# Patient Record
Sex: MALE | Race: WHITE
[De-identification: names, ages, dates, MRNs, and addresses within clinical notes are randomized per-mention and may not be internally consistent; named-entity substitution may affect disease eponyms.]

---

## 2018-09-05 ENCOUNTER — HOSPITAL ENCOUNTER (EMERGENCY)
Dept: HOSPITAL 31 - C.ER | Age: 38
Discharge: HOME | End: 2018-09-05
Payer: MEDICAID

## 2018-09-05 VITALS
DIASTOLIC BLOOD PRESSURE: 82 MMHG | HEART RATE: 88 BPM | TEMPERATURE: 98.7 F | SYSTOLIC BLOOD PRESSURE: 123 MMHG | OXYGEN SATURATION: 95 % | RESPIRATION RATE: 18 BRPM
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
## 2018-09-05 DIAGNOSIS — F10.10: Primary | ICD-10-CM

## 2018-09-05 NOTE — C.PDOC
History Of Present Illness


37 y/o male presents to the ER requesting alcohol detox. Patient reports last 

drink was today. He denies other drug use or any symptoms of withdrawal.





Time Seen by Provider: 09/05/18 13:55


Chief Complaint (Nursing): Medical Clearance


History Per: Patient


History/Exam Limitations: no limitations





Past Medical History


Reviewed: Historical Data, Nursing Documentation, Vital Signs


Vital Signs: 


 Last Vital Signs











Temp  98.7 F   09/05/18 13:43


 


Pulse  88   09/05/18 13:43


 


Resp  18   09/05/18 13:43


 


BP  123/82   09/05/18 13:43


 


Pulse Ox  95   09/05/18 13:58














- Medical History


PMH: No Chronic Diseases


Other Surgeries: Hx of surgeries


Family History: States: No Known Family Hx





- Social History


Hx Alcohol Use: Yes (UNKOWN FREQ AND AMOUNT)


Hx Substance Use: No (UNKNOWN)





- Immunization History


Hx Tetanus Toxoid Vaccination: No


Hx Influenza Vaccination: No


Hx Pneumococcal Vaccination: No





Review Of Systems


Except As Marked, All Systems Reviewed And Found Negative.


Constitutional: Negative for: Fever, Chills





Physical Exam





- Physical Exam


Appears: Well, Non-toxic, No Acute Distress


Skin: Warm, Dry, No Rash


Head: Atraumatic, Normacephalic


Eye(s): bilateral: Normal Inspection


Oral Mucosa: Moist


Neck: Normal ROM


Chest: Symmetrical


Extremity: Bilateral: Atraumatic, Normal ROM


Neurological/Psych: Oriented x3, Normal Speech


Gait: Steady





ED Course And Treatment


O2 Sat by Pulse Oximetry: 95 (RA)


Pulse Ox Interpretation: Normal





Medical Decision Making


Medical Decision Making: 





Patient requesting detox from alcohol. Contact crisis worker Deepika to evaluate. 

As per Deepika the patient does not meet admission criteria for inpatient detox. 

Patient to be discharged. 





Disposition


Counseled Patient/Family Regarding: Diagnosis, Need For Followup





- Disposition


Referrals: 


[facility] [Outside]


Alcoholics Anonymous [Outside]


Disposition: HOME/ ROUTINE


Disposition Time: 13:57


Condition: STABLE


Instructions:  Alcohol Abuse and Alcoholism (DC)


Forms:  [facility] (English)





- POA


Present On Arrival: None





- Clinical Impression


Clinical Impression: 


 Alcohol abuse








- PA / NP / Resident Statement


MD/DO has reviewed & agrees with the documentation as recorded.





- Scribe Statement


The provider has reviewed the documentation as recorded by the Charles Scott





Provider Attestation





All medical record entries made by the Scribe were at my direction and 

personally dictated by me. I have reviewed the chart and agree that the record 

accurately reflects my personal performance of the history, physical exam, 

medical decision making, and the department course for this patient. I have 

also personally directed, reviewed, and agree with the discharge instructions 

and disposition.

## 2018-09-10 ENCOUNTER — HOSPITAL ENCOUNTER (INPATIENT)
Dept: HOSPITAL 31 - C.ER | Age: 38
LOS: 4 days | Discharge: HOME | DRG: 750 | End: 2018-09-14
Attending: PSYCHIATRY & NEUROLOGY | Admitting: PSYCHIATRY & NEUROLOGY
Payer: MEDICAID

## 2018-09-10 DIAGNOSIS — F10.120: Primary | ICD-10-CM
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
## 2018-09-10 DIAGNOSIS — F32.2: ICD-10-CM

## 2018-09-10 LAB
ALBUMIN SERPL-MCNC: 5.1 G/DL (ref 3.5–5)
ALBUMIN/GLOB SERPL: 1.6 {RATIO} (ref 1–2.1)
ALT SERPL-CCNC: 84 U/L (ref 21–72)
AST SERPL-CCNC: 78 U/L (ref 17–59)
BASOPHILS # BLD AUTO: 0.1 K/UL (ref 0–0.2)
BASOPHILS NFR BLD: 2.4 % (ref 0–2)
BILIRUB UR-MCNC: NEGATIVE MG/DL
BUN SERPL-MCNC: 12 MG/DL (ref 9–20)
CALCIUM SERPL-MCNC: 8.9 MG/DL (ref 8.6–10.4)
EOSINOPHIL # BLD AUTO: 0 K/UL (ref 0–0.7)
EOSINOPHIL NFR BLD: 0.6 % (ref 0–4)
ERYTHROCYTE [DISTWIDTH] IN BLOOD BY AUTOMATED COUNT: 14 % (ref 11.5–14.5)
GFR NON-AFRICAN AMERICAN: > 60
GLUCOSE UR STRIP-MCNC: NORMAL MG/DL
HGB BLD-MCNC: 16.9 G/DL (ref 12–18)
HYALINE CASTS #/AREA URNS LPF: (no result) /LPF (ref 0–2)
LEUKOCYTE ESTERASE UR-ACNC: (no result) LEU/UL
LYMPHOCYTES # BLD AUTO: 2 K/UL (ref 1–4.3)
LYMPHOCYTES NFR BLD AUTO: 33.3 % (ref 20–40)
MCH RBC QN AUTO: 31.5 PG (ref 27–31)
MCHC RBC AUTO-ENTMCNC: 33.7 G/DL (ref 33–37)
MCV RBC AUTO: 93.4 FL (ref 80–94)
MONOCYTES # BLD: 0.3 K/UL (ref 0–0.8)
MONOCYTES NFR BLD: 5.8 % (ref 0–10)
NEUTROPHILS # BLD: 3.5 K/UL (ref 1.8–7)
NEUTROPHILS NFR BLD AUTO: 57.9 % (ref 50–75)
NRBC BLD AUTO-RTO: 0.2 % (ref 0–2)
PH UR STRIP: 5 [PH] (ref 5–8)
PLATELET # BLD: 272 K/UL (ref 130–400)
PMV BLD AUTO: 8.1 FL (ref 7.2–11.7)
PROT UR STRIP-MCNC: (no result) MG/DL
RBC # BLD AUTO: 5.36 MIL/UL (ref 4.4–5.9)
RBC # UR STRIP: (no result) /UL
SP GR UR STRIP: 1.02 (ref 1–1.03)
SQUAMOUS EPITHIAL: < 1 /HPF (ref 0–5)
UROBILINOGEN UR-MCNC: 2 MG/DL (ref 0.2–1)
WBC # BLD AUTO: 6 K/UL (ref 4.8–10.8)

## 2018-09-10 PROCEDURE — HZ2ZZZZ DETOXIFICATION SERVICES FOR SUBSTANCE ABUSE TREATMENT: ICD-10-PCS | Performed by: PSYCHIATRY & NEUROLOGY

## 2018-09-10 PROCEDURE — HZ56ZZZ INDIVIDUAL PSYCHOTHERAPY FOR SUBSTANCE ABUSE TREATMENT, PSYCHOEDUCATION: ICD-10-PCS | Performed by: PSYCHIATRY & NEUROLOGY

## 2018-09-10 PROCEDURE — GZHZZZZ GROUP PSYCHOTHERAPY: ICD-10-PCS | Performed by: PSYCHIATRY & NEUROLOGY

## 2018-09-10 PROCEDURE — HZ42ZZZ GROUP COUNSELING FOR SUBSTANCE ABUSE TREATMENT, COGNITIVE-BEHAVIORAL: ICD-10-PCS | Performed by: PSYCHIATRY & NEUROLOGY

## 2018-09-10 PROCEDURE — HZ52ZZZ INDIVIDUAL PSYCHOTHERAPY FOR SUBSTANCE ABUSE TREATMENT, COGNITIVE-BEHAVIORAL: ICD-10-PCS | Performed by: PSYCHIATRY & NEUROLOGY

## 2018-09-10 PROCEDURE — GZ56ZZZ INDIVIDUAL PSYCHOTHERAPY, SUPPORTIVE: ICD-10-PCS | Performed by: PSYCHIATRY & NEUROLOGY

## 2018-09-10 PROCEDURE — GZ58ZZZ INDIVIDUAL PSYCHOTHERAPY, COGNITIVE-BEHAVIORAL: ICD-10-PCS | Performed by: PSYCHIATRY & NEUROLOGY

## 2018-09-10 PROCEDURE — HZ46ZZZ GROUP COUNSELING FOR SUBSTANCE ABUSE TREATMENT, PSYCHOEDUCATION: ICD-10-PCS | Performed by: PSYCHIATRY & NEUROLOGY

## 2018-09-10 PROCEDURE — HZ59ZZZ INDIVIDUAL PSYCHOTHERAPY FOR SUBSTANCE ABUSE TREATMENT, SUPPORTIVE: ICD-10-PCS | Performed by: PSYCHIATRY & NEUROLOGY

## 2018-09-10 NOTE — C.PDOC
History Of Present Illness


38 year old male w/hx of alcohol dependance presents to the ER requesting detox 

from ETOH. Pt admits, last drink was this morning. AT present time, pt is AAO#3

, ambulatory. Pt Denies suicidal ideation, homicidal ideation, denies any 

active physical complaints at this time.


Time Seen by Provider: 09/10/18 14:25


Chief Complaint (Nursing): Substance Abuse


History Per: Patient


History/Exam Limitations: no limitations


Onset/Duration Of Symptoms: Hrs


Current Symptoms Are (Timing): Still Present


Suicide/Self Injury Attempted (Context): None


Modifying Factor(s): Alcohol


Associated Symptoms: denies: Suicidal Thoughts, Other (Homicidal ideation)


Involuntary Hold By: None


Recent travel outside of the [place]: No





Past Medical History


Reviewed: Historical Data, Nursing Documentation, Vital Signs


Vital Signs: 


 Last Vital Signs











Temp  99.2 F   09/10/18 17:17


 


Pulse  78   09/10/18 17:17


 


Resp  18   09/10/18 17:17


 


BP  138/69   09/10/18 17:17


 


Pulse Ox  98   09/10/18 17:45











Family History: States: Unknown Family Hx





- Social History


Hx Alcohol Use: Yes


Hx Substance Use: No





- Immunization History


Hx Tetanus Toxoid Vaccination: No


Hx Influenza Vaccination: No


Hx Pneumococcal Vaccination: No





Review Of Systems


Constitutional: Negative for: Fever, Chills


Cardiovascular: Negative for: Chest Pain, Palpitations


Respiratory: Negative for: Cough, Shortness of Breath


Gastrointestinal: Negative for: Nausea, Vomiting, Abdominal Pain


Psych: Negative for: Suicidal ideation, Other (Homicidal ideation)





Physical Exam





- Physical Exam


Appears: Well, Non-toxic


Skin: Normal Color, Warm, Dry


Head: Atraumatic, Normacephalic


Eye(s): bilateral: PERRL


Nose: No Flaring, No Discharge


Oral Mucosa: Moist, Other ((+) strong alcohol odor)


Tongue: Normal Appearing


Lips: Normal Appearing


Throat: No Erythema, No Drooling


Neck: No Midline Cervical Tenderness, No Step Off Deformity, Supple


Chest: Symmetrical, No Tenderness


Cardiovascular: Rhythm Regular


Respiratory: No Decreased Breath Sounds, No Accessory Muscle Use, No Rales, No 

Rhonchi, No Stridor, No Wheezing


Gastrointestinal/Abdominal: Soft, No Tenderness, No Distention, No Guarding


Back: No CVA Tenderness


Extremity: Normal ROM, No Tenderness, No Swelling


Extremity: Bilateral: Atraumatic


Neurological/Psych: Oriented x3, Normal Speech





ED Course And Treatment





- Laboratory Results


Result Diagrams: 


 09/10/18 14:48





 09/10/18 14:48


Lab Interpretation: No Acute Changes


O2 Sat by Pulse Oximetry: 98 (Room air)


Pulse Ox Interpretation: Normal


Progress Note: Blood work and urinalysis ordered. Crisis notified.  Alcohol 

395.  Case discussed with crisis, unable to assess pateint due to alcohol 

intoxication. Pt will be cleared by crisis eval at 00:30.  At 17:00, pt is 

resting comfortably in bed, easily arousable to verbal stimuli. Neuorlogicaly 

intact.  At 19:00, pt is easily arousable, neuorlogicaly intact.  No episodes 

of sz in ED.  Case discussed with and sign out for [facility] OBS/sobriety





Disposition





- Disposition


Disposition Time: 19:01


Condition: STABLE


Forms:  [facility] (English)





- Clinical Impression


Clinical Impression: 


 Alcohol dependence








- PA / NP / Resident Statement


MD/DO has reviewed & agrees with the documentation as recorded.





- Scribe Statement


The provider has reviewed the documentation as recorded by the Scribe





Benton David





All medical record entries made by the Scribe were at my direction and 

personally dictated by me. I have reviewed the chart and agree that the record 

accurately reflects my personal performance of the history, physical exam, 

medical decision making, and the department course for this patient. I have 

also personally directed, reviewed, and agree with the discharge instructions 

and disposition.





Physician Patient Turnover


Patient Signed Over To: Nikolay Gonzalez


Handoff Comments: sobrity/re-eval/crisis eval

## 2018-09-10 NOTE — PCM.BM
<Guanakito Dubon - Last Filed: 09/10/18 20:06>





Treatment Plan Problems





- Problems identified on initial assessmt


  ** potential for alcohol withdrawal


Date Initiated: 09/10/18


Time Initiated: 20:06


Status: Active





Treatment assets and liabiliti


Patient Assests: adapts well, cognitively intact


Patient Liabilities: substance abuse





- Milieu Protocol


Maintain good personal hygiene: daily Encourage regular showers, daily Remind 

patient to perform daily oral care, daily Assist patient to perform ADL's


Conduct patient checks and document Observation sheet: Q15 minutes


Maintain personal safety: every shift Educate patient to report safety concerns 

to staff, every shift Monitor environment for contraband/sharps


Medication safety: Monitor for expected outcome, potential side effects: every 

shift, Assess barriers to learning: every shift, Assess readiness for 

medication education: every shift





<Leanna Condon - Last Filed: 09/12/18 16:16>





- Diagnosis


(1) Major depression


Status: Acute   


Interventions: 





09/12/18 16:16


* Assess/adjust medications daily and /or as needed


* See patient on an individual basis 7x/week to assess symptoms of depression


* Monitor for side effects & effectiveness of medications


* 








(2) Alcohol dependence


Status: Acute   


Interventions: 





09/12/18 16:16


* Assess 7x/week regarding severity of withdrawal


* Educate regarding risks, benefits, side effects and alternatives of 

medications


* Use Motivational Interviewing for abstinence


* Use CBT for relapse prevention


* Medication management for withdrawal symptoms


* Encourage medication assisted treatment


* 








<Juana Devi - Last Filed: 09/13/18 08:11>





Family Contact


Family involvement: Famliy/SO not involved





- Goals for Treatment


Patient goals for treatment: Complete detox and transition to an IOP.





Discharge/Continuing Care





- Education Needs


Education Needs: Patient Medication, Patient Diagnosis/Disease Process, Patient 

Coping Skills, Patient Anger Management skills, Patient Placement options, 

Patient Community resources





- Discharge


Discharge Criteria: No longer exhibiting s/s of withdrawal, Reduction of target 

symptoms


Discharge to:: Home, With Family





- Treatment Team Participation


Patient/Family/SO Statement: 





09/13/18 08:11


"I wanna go to [facility]..."


Discussed with Family/SO: No


Was Patient/Family/SO present at Treatment Team Meeting: Yes

## 2018-09-11 VITALS — RESPIRATION RATE: 18 BRPM

## 2018-09-11 RX ADMIN — Medication SCH TAB: at 09:53

## 2018-09-11 NOTE — PCM.PSYCH
Initial Psychiatric Evaluation





- Initial Psychiatric Evaluation


Type of Admission: Voluntary


Legal Status: Capacity


Chief Complaint (in patient's own words): 





"I need to stop alcohol"


History of Present Illness and Precipitating Events: 





The pt is seen, chart reviewed and case discussed


He is a 37 yo LM, single, with a 15 y/o daughter, lives alone, 


he reports drinking 10+ shots of liquor, 3x16 oz beers a day


He escalated last 3 years but started when he was 9 y/o


He had DTs


No seizures


No drugs or cigarettes


First detox, no rehab


He had 2 DUIs





No medical


He is depressed but not suicidal


GM was an alcoholic too





Current Medications: 





Active Medications











Generic Name Dose Route Start Last Admin





  Trade Name Freq  PRN Reason Stop Dose Admin


 


Chlordiazepoxide  25 mg  09/10/18 20:20  09/11/18 09:57





  Librium  PO   25 mg





  Q4H PRN   Administration





  Alcohol Withdrawal   


 


Chlordiazepoxide  50 mg  09/11/18 06:00  09/11/18 05:08





  Librium  PO  09/16/18 05:59  50 mg





  Q6H BAHMAN   Administration





  Taper   


 


Clonidine HCl  0.1 mg  09/10/18 20:20  09/11/18 05:08





  Catapres  PO   0.1 mg





  Q4H PRN   Administration





  Symptoms of alcohol withdrawl   


 


Escitalopram Oxalate  10 mg  09/11/18 10:30  





  Lexapro  PO   





  DAILY BAHMAN   


 


Folic Acid  1 mg  09/11/18 10:00  09/11/18 09:52





  Folic Acid  PO   1 mg





  DAILY BAHMAN   Administration


 


Gabapentin  100 mg  09/11/18 10:00  09/11/18 09:52





  Neurontin  PO   100 mg





  TID BAHMAN   Administration


 


Hydroxyzine HCl  50 mg  09/10/18 23:46  09/11/18 05:08





  Atarax  PO   50 mg





  Q6H PRN   Administration





  Anxiety   


 


Ibuprofen  600 mg  09/10/18 23:46  





  Motrin Tab  PO   





  Q6H PRN   





  Pain, moderate (4-7)   


 


Multivitamins  1 tab  09/11/18 10:00  09/11/18 09:53





  Hexavitamin  PO   1 tab





  DAILY BAHMAN   Administration


 


Thiamine HCl  100 mg  09/11/18 10:00  09/11/18 09:53





  Vitamin B1 Tab  PO   100 mg





  DAILY BAHMAN   Administration


 


Trazodone HCl  100 mg  09/10/18 20:20  09/10/18 22:48





  Desyrel  PO   100 mg





  HS PRN   Administration





  Insomnia   














Past Psychiatric History





- Past Psychiatric History


Previous Treatment History: None


Pertinent Medical Hx (Current Medical&Sleep Prob, Allergies): 





 Allergies











Allergy/AdvReac Type Severity Reaction Status Date / Time


 


No Known Allergies Allergy   Unverified 09/05/18 13:45








 





No Known Home Med  09/05/18 











Review of Systems





- Psychiatric


Psychiatric: Abnormal Sleep Pattern, Anhedonia, Change in Appetite, Depression, 

Difficulty Concentrating.  absent: Homicidal Ideation, Paranoia, Suicidal 

Ideation





Mental Status Examination





- Personal Presentation


Personal Presentation: Looks stated age





- Affect


Affect: Constricted





- Motor Activity


Motor Activity: Calm





- Reliability in Providing Information


Reliability in Providing Information: Good





- Speech


Speech: Organized





- Mood


Mood: Depressed





- Formal Thought Process


Formal Thought Process: No Impairment





- Cognitive Functions


Orientation: Person, Place, Situation, Time


Sensorium: Alert


Attention/Concentration: Easily distracted


Estimate of Intelligence: Average


Judgement: Intact, as evidence by: Insight regarding need for hospitalization


Memory: Recent intact, as evidence by: Ability to recall events of the day, 

Remote intact, as evidenced by: Abilit to recall sig. life events





- Risk


Risk: Seizure, Withdrawal, Diminished functioning





- Strength & Assets Inventory


Strength & Assets Inventory: Life experience





- Limitations


Limitations: Living alone





DSM 5 DX





- DSM 5


DSM 5 Diagnosis: 





Alcohol withdrawal


Alcohol use d/o - severe


Major depression, single, severe





- Recommended/Plan of Treatment


Treatment Recommendations and Plan of Treatment: 





Librium detox


Lexapro for depression


As needed medications


Gabapentin for augmentation 


All risks, benefits and alternatives of medications, including no medications, 

discussed and the patient understood and agreed.


Attend groups and activities


Supportive therapy and psychoeducation


MI for abstinence


CBT for relapse prevention


Encourage MAT


Refer to rehab or IOP


Attend self-help groups as well


   


34 min


Projected ELOS: 5 days


Prognosis: good

## 2018-09-12 RX ADMIN — Medication SCH TAB: at 09:20

## 2018-09-12 NOTE — PCM.PYCHPN
Psychiatric Progress Note





- Psychiatric Progress Note


Patient seen today, length of contact: 16 min


Patient Chief Complaint: 





"I couldn't sleep well"


Problems Identified/Issues Discussed: 





The pt is seen, chart reviewed, case discussed with staff.


Support and psychoeducation given, CBT and MI used briefly


No new symptoms reported, improving slowly and needs more time


No SEs from medications, risks discussed.


After care discussed





Medication Change: Yes (detox changes daily)


Medical Record Reviewed: Yes





Mental Status Examination





- Cognitive Function


Orientation: Person, Place, Situation, Time


Memory: Intact


Attention: WNL


Concentration: Poor


Association: WNL


Fund of Knowledge: WNL





- Mood


Mood: Depressed





- Affect


Affect: Constricted





- Speech


Speech: Appropriate





- Formal Thought Process


Formal Thought Process: No Impairment





- Suicidal Ideation


Suicidal Ideation: No





- Homicidal Ideation


Homicidal Ideation: No





Goal/Treatment Plan





- Goal/Treatment Plan


Need for Continued Stay: Discharge may exacerbated symptoms, Severe functional 

impairment


Progress Toward Problem(s) and Goals/Treatment Plan: 





Librium detox


Lexapro for depression


As needed medications


Gabapentin for augmentation 


All risks, benefits and alternatives of medications, including no medications, 

discussed and the patient understood and agreed.


Attend groups and activities


Supportive therapy and psychoeducation


MI for abstinence


CBT for relapse prevention


Encourage MAT


Refer to rehab or IOP


Attend self-help groups as well

## 2018-09-13 RX ADMIN — Medication SCH TAB: at 09:32

## 2018-09-13 NOTE — PCM.PYCHPN
Psychiatric Progress Note





- Psychiatric Progress Note


Patient seen today, length of contact: 15 min


Patient Chief Complaint: 





"I am anxious"


Problems Identified/Issues Discussed: 


The pt is seen, chart reviewed, case discussed with staff.


The pt is compliant with medications and reports no side-effects.


Symptoms are improving but needs more time to stabilize. 


Pt attends groups and activities.


Support given, psycho-education provided. 


After care discussed.


He also wants naltrexone but LFTs are high, will repeat


Medication Change: Yes (detox changes daily)


Medical Record Reviewed: Yes





Mental Status Examination





- Cognitive Function


Orientation: Person, Place, Situation, Time


Memory: Intact


Attention: WNL


Concentration: Poor


Association: WNL


Fund of Knowledge: WNL





- Mood


Mood: Depressed





- Affect


Affect: Constricted





- Speech


Speech: Appropriate





- Formal Thought Process


Formal Thought Process: No Impairment





- Suicidal Ideation


Suicidal Ideation: No





- Homicidal Ideation


Homicidal Ideation: No





Goal/Treatment Plan





- Goal/Treatment Plan


Need for Continued Stay: Discharge may exacerbated symptoms, Severe functional 

impairment


Progress Toward Problem(s) and Goals/Treatment Plan: 





Librium detox


Lexapro for depression


As needed medications


Gabapentin for augmentation 


All risks, benefits and alternatives of medications, including no medications, 

discussed and the patient understood and agreed.


Attend groups and activities


Supportive therapy and psychoeducation


MI for abstinence


CBT for relapse prevention


Encourage MAT


Refer to rehab or IOP


Attend self-help groups as well

## 2018-09-14 VITALS
HEART RATE: 69 BPM | OXYGEN SATURATION: 97 % | SYSTOLIC BLOOD PRESSURE: 94 MMHG | TEMPERATURE: 97.5 F | DIASTOLIC BLOOD PRESSURE: 60 MMHG

## 2018-09-14 LAB
ALBUMIN SERPL-MCNC: 4.2 G/DL (ref 3.5–5)
ALBUMIN/GLOB SERPL: 1.8 {RATIO} (ref 1–2.1)
ALT SERPL-CCNC: 56 U/L (ref 21–72)
AST SERPL-CCNC: 26 U/L (ref 17–59)
BUN SERPL-MCNC: 14 MG/DL (ref 9–20)
CALCIUM SERPL-MCNC: 8.9 MG/DL (ref 8.6–10.4)
GFR NON-AFRICAN AMERICAN: > 60

## 2018-09-14 RX ADMIN — Medication SCH TAB: at 09:19

## 2018-09-14 NOTE — PCM.PYCHDC
Mental Status Examination





- Mental Status Examination


Orientation: Person





Discharge Summary





- Discharge Note


Laboratory Data: 





 Abnormal Lab Results











  09/14/18





  08:13


 


Sodium  138


 


Potassium  3.8


 


Chloride  103


 


Carbon Dioxide  23


 


Anion Gap  15


 


BUN  14


 


Creatinine  0.6 L


 


Est GFR ( Amer)  > 60


 


Est GFR (Non-Af Amer)  > 60


 


Random Glucose  77


 


Calcium  8.9


 


Total Bilirubin  0.5


 


AST  26


 


ALT  56


 


Alkaline Phosphatase  35 L D


 


Total Protein  6.5


 


Albumin  4.2


 


Globulin  2.4


 


Albumin/Globulin Ratio  1.8











Consultations:: List each consultation separately and include:  1. Reason for 

request.  2. Findings.  3. Follow-up


Summary of Hospital Course include:: 1. Description of specific treatment plan 

utilized for patients during their course of treatmen.  2. Summarize the time-

course for resolution of acute symptoms and/or regressed behaviors.  3. 

Describe issues identified and worked on during hospitalization.  4. Describe 

medication utilized.  5. Describe medical problems identified and treated.  6. 

Reassessment of suicide risk


Summary of Hospital Course: 





The pt is seen, chart reviewed and case discussed


He is a 39 yo LM, single, with a 15 y/o daughter, lives alone, 


he reports drinking 10+ shots of liquor, 3x16 oz beers a day


He escalated last 3 years but started when he was 7 y/o


He had DTs


No seizures


No drugs or cigarettes


First detox, no rehab


He had 2 DUIs





No medical


He is depressed but not suicidal


GM was an alcoholic too











He will goto [facility].





- Diagnosis


(1) Major depression


Current Visit: Yes   Status: Acute   





(2) Alcohol dependence


Current Visit: Yes   Status: Acute   





- Final Diagnosis (DSM 5)


Condition upon Discharge: STABLE


Disposition: HOME/ ROUTINE


Follow-up Treatment Plan: 





Librium detox


Lexapro for depression


As needed medications


Gabapentin for augmentation 


All risks, benefits and alternatives of medications, including no medications, 

discussed and the patient understood and agreed.


Attend groups and activities


Supportive therapy and psychoeducation


MI for abstinence


CBT for relapse prevention


Encourage MAT


Refer to rehab or IOP


Attend self-help groups as well





Prescriptions/Medication Reconciliation: 


Escitalopram [Lexapro] 10 mg PO DAILY #30 tab


Gabapentin [Neurontin] 100 mg PO TID #90 cap


QUEtiapine [SEROquel] 50 mg PO HS #30 tab


traZODone [Desyrel] 100 mg PO HS PRN #30 tab


 PRN Reason: Insomnia